# Patient Record
Sex: FEMALE | Race: WHITE | NOT HISPANIC OR LATINO
[De-identification: names, ages, dates, MRNs, and addresses within clinical notes are randomized per-mention and may not be internally consistent; named-entity substitution may affect disease eponyms.]

---

## 2017-01-03 ENCOUNTER — RX RENEWAL (OUTPATIENT)
Age: 42
End: 2017-01-03

## 2017-01-10 ENCOUNTER — OUTPATIENT (OUTPATIENT)
Dept: OUTPATIENT SERVICES | Facility: HOSPITAL | Age: 42
LOS: 1 days | End: 2017-01-10
Payer: COMMERCIAL

## 2017-01-11 PROCEDURE — A9516: CPT

## 2017-01-11 PROCEDURE — 78014 THYROID IMAGING W/BLOOD FLOW: CPT

## 2017-01-11 PROCEDURE — 78014 THYROID IMAGING W/BLOOD FLOW: CPT | Mod: 26

## 2017-01-20 ENCOUNTER — TRANSCRIPTION ENCOUNTER (OUTPATIENT)
Age: 42
End: 2017-01-20

## 2017-01-20 DIAGNOSIS — E04.9 NONTOXIC GOITER, UNSPECIFIED: ICD-10-CM

## 2017-01-20 DIAGNOSIS — E05.00 THYROTOXICOSIS WITH DIFFUSE GOITER W/OUT THYROTOXIC CRISIS OR STORM: ICD-10-CM

## 2017-01-23 ENCOUNTER — TRANSCRIPTION ENCOUNTER (OUTPATIENT)
Age: 42
End: 2017-01-23

## 2017-01-23 ENCOUNTER — OUTPATIENT (OUTPATIENT)
Dept: OUTPATIENT SERVICES | Facility: HOSPITAL | Age: 42
LOS: 1 days | End: 2017-01-23
Payer: COMMERCIAL

## 2017-01-23 PROCEDURE — A9517: CPT

## 2017-01-23 PROCEDURE — 79005 NUCLEAR RX ORAL ADMIN: CPT

## 2017-01-23 PROCEDURE — 79005 NUCLEAR RX ORAL ADMIN: CPT | Mod: 26

## 2017-01-23 PROCEDURE — 84702 CHORIONIC GONADOTROPIN TEST: CPT

## 2017-01-26 ENCOUNTER — TRANSCRIPTION ENCOUNTER (OUTPATIENT)
Age: 42
End: 2017-01-26

## 2017-02-06 ENCOUNTER — TRANSCRIPTION ENCOUNTER (OUTPATIENT)
Age: 42
End: 2017-02-06

## 2017-02-06 DIAGNOSIS — R53.1 WEAKNESS: ICD-10-CM

## 2017-02-13 ENCOUNTER — TRANSCRIPTION ENCOUNTER (OUTPATIENT)
Age: 42
End: 2017-02-13

## 2017-02-15 ENCOUNTER — TRANSCRIPTION ENCOUNTER (OUTPATIENT)
Age: 42
End: 2017-02-15

## 2017-02-16 ENCOUNTER — APPOINTMENT (OUTPATIENT)
Dept: ENDOCRINOLOGY | Facility: CLINIC | Age: 42
End: 2017-02-16

## 2017-02-16 VITALS
BODY MASS INDEX: 36.14 KG/M2 | SYSTOLIC BLOOD PRESSURE: 116 MMHG | HEART RATE: 59 BPM | DIASTOLIC BLOOD PRESSURE: 79 MMHG | HEIGHT: 63 IN | WEIGHT: 204 LBS

## 2017-02-16 DIAGNOSIS — M79.1 MYALGIA: ICD-10-CM

## 2017-02-16 DIAGNOSIS — E05.20 THYROTOXICOSIS WITH TOXIC MULTINODULAR GOITER W/OUT THYROTOXIC CRISIS OR STORM: ICD-10-CM

## 2017-02-28 ENCOUNTER — TRANSCRIPTION ENCOUNTER (OUTPATIENT)
Age: 42
End: 2017-02-28

## 2017-03-05 LAB
ADRENAL AB SER-ACNC: <1 U/ML
ALBUMIN SERPL ELPH-MCNC: 4.4 G/DL
ALP BLD-CCNC: 75 U/L
ALT SERPL-CCNC: 24 U/L
ANION GAP SERPL CALC-SCNC: 16 MMOL/L
AST SERPL-CCNC: 19 U/L
BILIRUB SERPL-MCNC: <0.2 MG/DL
BUN SERPL-MCNC: 15 MG/DL
CALCIUM SERPL-MCNC: 9.6 MG/DL
CHLORIDE SERPL-SCNC: 104 MMOL/L
CO2 SERPL-SCNC: 21 MMOL/L
CORTIS SERPL-MCNC: 10.3 UG/DL
CREAT SERPL-MCNC: 0.65 MG/DL
GLUCOSE SERPL-MCNC: 90 MG/DL
MAGNESIUM SERPL-MCNC: 1.8 MG/DL
PHOSPHATE SERPL-MCNC: 3.4 MG/DL
POTASSIUM SERPL-SCNC: 4.2 MMOL/L
PROT SERPL-MCNC: 7.2 G/DL
SODIUM SERPL-SCNC: 141 MMOL/L
T3 SERPL-MCNC: 90 NG/DL
T3FREE SERPL-MCNC: 2.5 PG/ML
T4 FREE SERPL-MCNC: 1 NG/DL
T4 SERPL-MCNC: 6.3 UG/DL
TSH SERPL-ACNC: 0.01 UIU/ML

## 2017-03-06 LAB
ANA SER IF-ACNC: NORMAL
CK SERPL-CCNC: 61 U/L
ERYTHROCYTE [SEDIMENTATION RATE] IN BLOOD BY WESTERGREN METHOD: 17 MM/HR
RHEUMATOID FACT SER QL: <7 IU/ML
T3 SERPL-MCNC: 98 NG/DL
T4 FREE SERPL-MCNC: 1 NG/DL
TSH SERPL-ACNC: 0.03 UIU/ML

## 2017-03-07 LAB — CORTIS SAL-MCNC: NORMAL

## 2017-03-09 ENCOUNTER — TRANSCRIPTION ENCOUNTER (OUTPATIENT)
Age: 42
End: 2017-03-09

## 2017-03-10 ENCOUNTER — TRANSCRIPTION ENCOUNTER (OUTPATIENT)
Age: 42
End: 2017-03-10

## 2017-04-27 ENCOUNTER — TRANSCRIPTION ENCOUNTER (OUTPATIENT)
Age: 42
End: 2017-04-27

## 2017-05-26 ENCOUNTER — OTHER (OUTPATIENT)
Age: 42
End: 2017-05-26

## 2017-05-26 ENCOUNTER — TRANSCRIPTION ENCOUNTER (OUTPATIENT)
Age: 42
End: 2017-05-26

## 2017-06-15 LAB
25(OH)D3 SERPL-MCNC: 24.4 NG/ML
ALBUMIN SERPL ELPH-MCNC: 4.7 G/DL
ALP BLD-CCNC: 73 U/L
ALT SERPL-CCNC: 17 U/L
ANION GAP SERPL CALC-SCNC: 16 MMOL/L
AST SERPL-CCNC: 17 U/L
BASOPHILS # BLD AUTO: 0.02 K/UL
BASOPHILS NFR BLD AUTO: 0.4 %
BILIRUB SERPL-MCNC: 0.3 MG/DL
BUN SERPL-MCNC: 17 MG/DL
CALCIUM SERPL-MCNC: 9.8 MG/DL
CHLORIDE SERPL-SCNC: 101 MMOL/L
CO2 SERPL-SCNC: 22 MMOL/L
CREAT SERPL-MCNC: 0.78 MG/DL
EOSINOPHIL # BLD AUTO: 0.13 K/UL
EOSINOPHIL NFR BLD AUTO: 2.4 %
FOLATE SERPL-MCNC: >20 NG/ML
GLUCOSE SERPL-MCNC: 99 MG/DL
HBA1C MFR BLD HPLC: 5.4 %
HCT VFR BLD CALC: 40.7 %
HGB BLD-MCNC: 13.1 G/DL
IMM GRANULOCYTES NFR BLD AUTO: 0.2 %
LYMPHOCYTES # BLD AUTO: 1.81 K/UL
LYMPHOCYTES NFR BLD AUTO: 32.8 %
MAN DIFF?: NORMAL
MCHC RBC-ENTMCNC: 29.5 PG
MCHC RBC-ENTMCNC: 32.2 GM/DL
MCV RBC AUTO: 91.7 FL
MONOCYTES # BLD AUTO: 0.42 K/UL
MONOCYTES NFR BLD AUTO: 7.6 %
NEUTROPHILS # BLD AUTO: 3.13 K/UL
NEUTROPHILS NFR BLD AUTO: 56.6 %
PLATELET # BLD AUTO: 277 K/UL
POTASSIUM SERPL-SCNC: 4.1 MMOL/L
PROT SERPL-MCNC: 7.4 G/DL
RBC # BLD: 4.44 M/UL
RBC # FLD: 13.3 %
SODIUM SERPL-SCNC: 139 MMOL/L
T3 SERPL-MCNC: 142 NG/DL
T4 FREE SERPL-MCNC: 1.2 NG/DL
TSH SERPL-ACNC: 4.2 UIU/ML
VIT B12 SERPL-MCNC: 671 PG/ML
WBC # FLD AUTO: 5.52 K/UL

## 2017-06-22 ENCOUNTER — APPOINTMENT (OUTPATIENT)
Dept: ENDOCRINOLOGY | Facility: CLINIC | Age: 42
End: 2017-06-22

## 2017-06-22 VITALS
DIASTOLIC BLOOD PRESSURE: 87 MMHG | WEIGHT: 208 LBS | BODY MASS INDEX: 36.86 KG/M2 | HEIGHT: 63 IN | HEART RATE: 81 BPM | SYSTOLIC BLOOD PRESSURE: 132 MMHG

## 2017-06-22 DIAGNOSIS — Z92.3 PERSONAL HISTORY OF IRRADIATION: ICD-10-CM

## 2017-06-22 DIAGNOSIS — E03.9 HYPOTHYROIDISM, UNSPECIFIED: ICD-10-CM

## 2017-06-22 DIAGNOSIS — R63.5 ABNORMAL WEIGHT GAIN: ICD-10-CM

## 2017-06-22 DIAGNOSIS — L65.9 NONSCARRING HAIR LOSS, UNSPECIFIED: ICD-10-CM

## 2017-06-22 RX ORDER — SPIRONOLACTONE 50 MG/1
50 TABLET ORAL DAILY
Qty: 90 | Refills: 3 | Status: ACTIVE | COMMUNITY
Start: 2017-06-22 | End: 1900-01-01

## 2017-06-22 RX ORDER — METFORMIN ER 500 MG 500 MG/1
500 TABLET ORAL
Qty: 360 | Refills: 3 | Status: ACTIVE | COMMUNITY
Start: 2017-06-22 | End: 1900-01-01

## 2017-06-22 RX ORDER — LEVOTHYROXINE SODIUM 0.05 MG/1
50 TABLET ORAL
Qty: 90 | Refills: 2 | Status: ACTIVE | COMMUNITY
Start: 2017-06-22 | End: 1900-01-01

## 2017-08-22 ENCOUNTER — APPOINTMENT (OUTPATIENT)
Dept: ENDOCRINOLOGY | Facility: CLINIC | Age: 42
End: 2017-08-22

## 2017-08-23 ENCOUNTER — TRANSCRIPTION ENCOUNTER (OUTPATIENT)
Age: 42
End: 2017-08-23

## 2017-08-24 ENCOUNTER — TRANSCRIPTION ENCOUNTER (OUTPATIENT)
Age: 42
End: 2017-08-24

## 2018-05-03 ENCOUNTER — RX RENEWAL (OUTPATIENT)
Age: 43
End: 2018-05-03

## 2018-05-03 DIAGNOSIS — I10 ESSENTIAL (PRIMARY) HYPERTENSION: ICD-10-CM

## 2018-05-03 RX ORDER — PROPRANOLOL HYDROCHLORIDE 60 MG/1
60 CAPSULE, EXTENDED RELEASE ORAL DAILY
Qty: 60 | Refills: 2 | Status: ACTIVE | COMMUNITY
Start: 2018-05-03 | End: 1900-01-01

## 2019-03-21 ENCOUNTER — EMERGENCY (EMERGENCY)
Facility: HOSPITAL | Age: 44
LOS: 0 days | Discharge: HOME | End: 2019-03-21
Admitting: PHYSICIAN ASSISTANT

## 2019-03-21 VITALS — HEIGHT: 64 IN | WEIGHT: 199.96 LBS

## 2019-03-21 VITALS — TEMPERATURE: 99 F

## 2019-03-21 DIAGNOSIS — M79.601 PAIN IN RIGHT ARM: ICD-10-CM

## 2019-03-21 DIAGNOSIS — M79.602 PAIN IN LEFT ARM: ICD-10-CM

## 2019-03-21 DIAGNOSIS — M62.81 MUSCLE WEAKNESS (GENERALIZED): ICD-10-CM

## 2019-03-21 DIAGNOSIS — M79.604 PAIN IN RIGHT LEG: ICD-10-CM

## 2019-03-21 DIAGNOSIS — M79.605 PAIN IN LEFT LEG: ICD-10-CM

## 2019-03-21 NOTE — ED PROVIDER NOTE - CARE PROVIDER_API CALL
Raghu Mclaughlin)  Neurology; Neuromuscular Medicine  [address] [place] [address], [address]  [place], [place] [id number]  Phone: [phone]  Fax: [phone]  Follow Up Time:     Anthony Madsen)  Neurology  [address] [place] [address]  Phone: [phone]  Fax: [phone]  Follow Up Time:     Carolina Parker)  Neurology; Neurophysiology  [address] [place] [address]  Phone: [phone]  Fax: [phone]  Follow Up Time:     Jerrell Arzate (DO)  Internal Medicine  [address] [place] [address]  Phone: [phone]  Fax: [phone]  Follow Up Time:

## 2019-03-21 NOTE — ED PROVIDER NOTE - NSFOLLOWUPINSTRUCTIONS_ED_ALL_ED_FT
Follow up with your primary care physician/neurology  See information sheet(s) for further discharge instructions

## 2019-03-21 NOTE — ED PROVIDER NOTE - OBJECTIVE STATEMENT
42 y/o F, PMHx Hyperthyroidism - s/p iodine tx 2 years ago, presents to the ED with complaints of weakness and pain of her b/l upper and lower extremities that has been intermittent over the past two years. She denies associated weakness, gait abnormality, skin color changes/rash, fever, chills, chest pain, dyspnea, abdominal pain, nausea, vomiting, recent travel and recent known sick contacts. She states that she saw a rheumatologist for this approximately one year ago and underwent labwork that did not reveal acute abnormality. She additionally saw her PMD, Dr. Salgado,, a few weeks ago and again underwent labwork that was reportedly normal.

## 2019-03-21 NOTE — ED PROVIDER NOTE - CARE PROVIDERS DIRECT ADDRESSES
[email or web address]

## 2019-03-21 NOTE — ED PROVIDER NOTE - PROVIDER TOKENS
PROVIDER:[TOKEN:[[id number]]],PROVIDER:[TOKEN:[[id number]]],PROVIDER:[TOKEN:[[id number]]],PROVIDER:[TOKEN:[[id number]]]

## 2019-03-21 NOTE — ED PROVIDER NOTE - CARDIOVASCULAR NEGATIVE STATEMENT, MLM
0800:  Oxycodone 20 mg (2 tabs) given for 9/10 pain.  Barcode not scanning.  Pharmacy aware.   no chest pain and no edema.

## 2019-03-21 NOTE — ED ADULT NURSE NOTE - NSIMPLEMENTINTERV_GEN_ALL_ED
Implemented All Universal Safety Interventions:  [place] to call system. Call bell, personal items and telephone within reach. Instruct patient to call for assistance. Room bathroom lighting operational. Non-slip footwear when patient is off stretcher. Physically safe environment: no spills, clutter or unnecessary equipment. Stretcher in lowest position, wheels locked, appropriate side rails in place.

## 2022-03-25 ENCOUNTER — TRANSCRIPTION ENCOUNTER (OUTPATIENT)
Age: 47
End: 2022-03-25

## 2022-04-23 ENCOUNTER — LABORATORY RESULT (OUTPATIENT)
Age: 47
End: 2022-04-23

## 2022-04-25 ENCOUNTER — TRANSCRIPTION ENCOUNTER (OUTPATIENT)
Age: 47
End: 2022-04-25

## 2022-04-25 VITALS
HEIGHT: 64 IN | OXYGEN SATURATION: 98 % | WEIGHT: 193.79 LBS | DIASTOLIC BLOOD PRESSURE: 70 MMHG | RESPIRATION RATE: 18 BRPM | TEMPERATURE: 98 F | SYSTOLIC BLOOD PRESSURE: 106 MMHG | HEART RATE: 84 BPM

## 2022-04-25 RX ORDER — SEMAGLUTIDE 0.68 MG/ML
0 INJECTION, SOLUTION SUBCUTANEOUS
Qty: 0 | Refills: 0 | DISCHARGE

## 2022-04-25 NOTE — ASU PATIENT PROFILE, ADULT - FALL HARM RISK - UNIVERSAL INTERVENTIONS
Bed in lowest position, wheels locked, appropriate side rails in place/Call bell, personal items and telephone in reach/Instruct patient to call for assistance before getting out of bed or chair/Non-slip footwear when patient is out of bed/[place] to call system/Physically safe environment - no spills, clutter or unnecessary equipment/Purposeful Proactive Rounding/Room/bathroom lighting operational, light cord in reach

## 2022-04-26 ENCOUNTER — OUTPATIENT (OUTPATIENT)
Dept: OUTPATIENT SERVICES | Facility: HOSPITAL | Age: 47
LOS: 1 days | Discharge: HOME | End: 2022-04-26
Payer: COMMERCIAL

## 2022-04-26 ENCOUNTER — TRANSCRIPTION ENCOUNTER (OUTPATIENT)
Age: 47
End: 2022-04-26

## 2022-04-26 VITALS — RESPIRATION RATE: 16 BRPM | OXYGEN SATURATION: 97 % | HEART RATE: 74 BPM

## 2022-04-26 DIAGNOSIS — S83.209A UNSPECIFIED TEAR OF UNSPECIFIED MENISCUS, CURRENT INJURY, UNSPECIFIED KNEE, INITIAL ENCOUNTER: ICD-10-CM

## 2022-04-26 DIAGNOSIS — Z98.891 HISTORY OF UTERINE SCAR FROM PREVIOUS SURGERY: Chronic | ICD-10-CM

## 2022-04-26 PROCEDURE — 29880 ARTHRS KNE SRG MNISECTMY M&L: CPT | Mod: RT

## 2022-04-26 RX ORDER — ACETAMINOPHEN 500 MG
1000 TABLET ORAL ONCE
Refills: 0 | Status: DISCONTINUED | OUTPATIENT
Start: 2022-04-26 | End: 2022-04-26

## 2022-04-26 RX ORDER — SEMAGLUTIDE 0.68 MG/ML
0.5 INJECTION, SOLUTION SUBCUTANEOUS
Qty: 0 | Refills: 0 | DISCHARGE

## 2022-04-26 RX ORDER — ONDANSETRON 8 MG/1
4 TABLET, FILM COATED ORAL ONCE
Refills: 0 | Status: COMPLETED | OUTPATIENT
Start: 2022-04-26 | End: 2022-04-26

## 2022-04-26 RX ORDER — HYDROMORPHONE HYDROCHLORIDE 2 MG/ML
0.5 INJECTION INTRAMUSCULAR; INTRAVENOUS; SUBCUTANEOUS
Refills: 0 | Status: DISCONTINUED | OUTPATIENT
Start: 2022-04-26 | End: 2022-04-26

## 2022-04-26 RX ORDER — OXYCODONE HYDROCHLORIDE 5 MG/1
5 TABLET ORAL EVERY 4 HOURS
Refills: 0 | Status: DISCONTINUED | OUTPATIENT
Start: 2022-04-26 | End: 2022-04-26

## 2022-04-26 RX ORDER — OXYCODONE HYDROCHLORIDE 5 MG/1
10 TABLET ORAL EVERY 4 HOURS
Refills: 0 | Status: DISCONTINUED | OUTPATIENT
Start: 2022-04-26 | End: 2022-04-26

## 2022-04-26 RX ADMIN — HYDROMORPHONE HYDROCHLORIDE 0.5 MILLIGRAM(S): 2 INJECTION INTRAMUSCULAR; INTRAVENOUS; SUBCUTANEOUS at 09:56

## 2022-04-26 RX ADMIN — ONDANSETRON 4 MILLIGRAM(S): 8 TABLET, FILM COATED ORAL at 11:40

## 2022-04-26 RX ADMIN — HYDROMORPHONE HYDROCHLORIDE 0.5 MILLIGRAM(S): 2 INJECTION INTRAMUSCULAR; INTRAVENOUS; SUBCUTANEOUS at 09:57

## 2022-04-26 NOTE — PACU DISCHARGE NOTE - COMMENTS
Pt AxO x4. Ambulated with steady gait. Pain controlled with Dilaudid .5mg iv x1. Nausea controlled with Zofran 4 mg iv. Rt knee with ace wraps on, dry and intact. Discharge instructions given, verbalized understanding. Pain medications for pickup at [facility].

## 2022-04-26 NOTE — ASU DISCHARGE PLAN (ADULT/PEDIATRIC) - CARE PROVIDER_API CALL
Caleb El)  Orthopaedic Surgery Surgery  [address] [place] [address]  Phone: [phone]  Fax: [phone]  Follow Up Time:

## 2022-04-26 NOTE — ASU DISCHARGE PLAN (ADULT/PEDIATRIC) - NS MD DC FALL RISK RISK
For information on Fall & Injury Prevention, visit: [email or web address] OR  [email or web address] OR  https://www.cdc.gov/steadi/patient.html

## 2022-04-26 NOTE — ASU DISCHARGE PLAN (ADULT/PEDIATRIC) - ACTIVITY LEVEL
No excercise/No heavy lifting/No sports/gym/Weight bearing as tolerated/Elevate extremity/No tub baths

## 2023-03-20 NOTE — ASU PATIENT PROFILE, ADULT - DATE OF FIRST COVID-19 BOOSTER
Following POC.  Makes needs known.  Denies SI/HI, VH.  Endorses long term AH.  Out on the unit part of the evening.  On the phone frequently.  Crying, anxious.  Medication complaint.  Appetite is good.  Encouraged group attendance.  Free of fall.   20-Dec-2021

## 2024-02-13 ENCOUNTER — NON-APPOINTMENT (OUTPATIENT)
Age: 49
End: 2024-02-13

## 2024-02-13 PROBLEM — Z78.9 OTHER SPECIFIED HEALTH STATUS: Chronic | Status: ACTIVE | Noted: 2022-04-26

## 2024-04-23 ENCOUNTER — APPOINTMENT (OUTPATIENT)
Dept: OBGYN | Facility: CLINIC | Age: 49
End: 2024-04-23

## 2025-03-17 ENCOUNTER — NON-APPOINTMENT (OUTPATIENT)
Age: 50
End: 2025-03-17

## 2025-05-27 ENCOUNTER — APPOINTMENT (OUTPATIENT)
Dept: OBGYN | Facility: CLINIC | Age: 50
End: 2025-05-27
Payer: COMMERCIAL

## 2025-05-27 VITALS — WEIGHT: 167 LBS | BODY MASS INDEX: 30.73 KG/M2 | HEIGHT: 62 IN

## 2025-05-27 DIAGNOSIS — Z01.419 ENCOUNTER FOR GYNECOLOGICAL EXAMINATION (GENERAL) (ROUTINE) W/OUT ABNORMAL FINDINGS: ICD-10-CM

## 2025-05-27 DIAGNOSIS — N95.1 MENOPAUSAL AND FEMALE CLIMACTERIC STATES: ICD-10-CM

## 2025-05-27 PROCEDURE — 99386 PREV VISIT NEW AGE 40-64: CPT

## 2025-05-27 RX ORDER — ESTRADIOL 0.03 MG/D
0.03 PATCH TRANSDERMAL
Qty: 3 | Refills: 1 | Status: ACTIVE | COMMUNITY
Start: 2025-05-27 | End: 1900-01-01

## 2025-05-27 RX ORDER — PROGESTERONE 100 MG/1
100 CAPSULE ORAL
Qty: 90 | Refills: 3 | Status: ACTIVE | COMMUNITY
Start: 2025-05-27 | End: 1900-01-01

## 2025-05-29 LAB — HPV HIGH+LOW RISK DNA PNL CVX: NOT DETECTED

## 2025-08-21 ENCOUNTER — APPOINTMENT (OUTPATIENT)
Dept: SURGICAL ONCOLOGY | Facility: CLINIC | Age: 50
End: 2025-08-21
Payer: COMMERCIAL

## 2025-08-21 DIAGNOSIS — E04.1 NONTOXIC SINGLE THYROID NODULE: ICD-10-CM

## 2025-08-21 PROCEDURE — 10005 FNA BX W/US GDN 1ST LES: CPT

## 2025-08-21 PROCEDURE — 99205 OFFICE O/P NEW HI 60 MIN: CPT | Mod: 25

## 2025-08-22 ENCOUNTER — RESULT REVIEW (OUTPATIENT)
Age: 50
End: 2025-08-22

## 2025-09-11 ENCOUNTER — NON-APPOINTMENT (OUTPATIENT)
Age: 50
End: 2025-09-11

## (undated) DEVICE — SOL IRR BAG NS 0.9% 3000ML

## (undated) DEVICE — MARKING PEN W RULER

## (undated) DEVICE — PACK KNEE ARTHROSCOPY

## (undated) DEVICE — WARMING BLANKET UPPER ADULT

## (undated) DEVICE — GLV 7.5 PROTEXIS (WHITE)

## (undated) DEVICE — POSITIONER FOAM EGG CRATE ULNAR 2PCS (PINK)

## (undated) DEVICE — GLV 8 PROTEXIS (WHITE)

## (undated) DEVICE — ARTHREX ARTHROSCOPY TUBING

## (undated) DEVICE — VENODYNE/SCD SLEEVE CALF MEDIUM